# Patient Record
Sex: MALE | Race: WHITE | HISPANIC OR LATINO | Employment: FULL TIME | URBAN - METROPOLITAN AREA
[De-identification: names, ages, dates, MRNs, and addresses within clinical notes are randomized per-mention and may not be internally consistent; named-entity substitution may affect disease eponyms.]

---

## 2019-10-31 ENCOUNTER — HOSPITAL ENCOUNTER (EMERGENCY)
Facility: HOSPITAL | Age: 24
Discharge: HOME | End: 2019-10-31
Attending: EMERGENCY MEDICINE

## 2019-10-31 VITALS
BODY MASS INDEX: 25.71 KG/M2 | HEIGHT: 66 IN | RESPIRATION RATE: 20 BRPM | TEMPERATURE: 98 F | WEIGHT: 160 LBS | OXYGEN SATURATION: 98 % | DIASTOLIC BLOOD PRESSURE: 80 MMHG | SYSTOLIC BLOOD PRESSURE: 145 MMHG | HEART RATE: 101 BPM

## 2019-10-31 DIAGNOSIS — R11.2 NAUSEA AND VOMITING, INTRACTABILITY OF VOMITING NOT SPECIFIED, UNSPECIFIED VOMITING TYPE: Primary | ICD-10-CM

## 2019-10-31 DIAGNOSIS — R10.9 ABDOMINAL PAIN: ICD-10-CM

## 2019-10-31 LAB
ALBUMIN SERPL BCP-MCNC: 4.6 G/DL (ref 3.5–5.2)
ALP SERPL-CCNC: 70 U/L (ref 55–135)
ALT SERPL W/O P-5'-P-CCNC: 17 U/L (ref 10–44)
ANION GAP SERPL CALC-SCNC: 16 MMOL/L (ref 8–16)
AST SERPL-CCNC: 30 U/L (ref 10–40)
BASOPHILS # BLD AUTO: 0.02 K/UL (ref 0–0.2)
BASOPHILS NFR BLD: 0.2 % (ref 0–1.9)
BILIRUB SERPL-MCNC: 0.6 MG/DL (ref 0.1–1)
BILIRUB UR QL STRIP: NEGATIVE
BUN SERPL-MCNC: 9 MG/DL (ref 6–20)
CALCIUM SERPL-MCNC: 9.6 MG/DL (ref 8.7–10.5)
CHLORIDE SERPL-SCNC: 102 MMOL/L (ref 95–110)
CLARITY UR: CLEAR
CO2 SERPL-SCNC: 23 MMOL/L (ref 23–29)
COLOR UR: YELLOW
CREAT SERPL-MCNC: 0.8 MG/DL (ref 0.5–1.4)
DIFFERENTIAL METHOD: ABNORMAL
EOSINOPHIL # BLD AUTO: 0 K/UL (ref 0–0.5)
EOSINOPHIL NFR BLD: 0 % (ref 0–8)
ERYTHROCYTE [DISTWIDTH] IN BLOOD BY AUTOMATED COUNT: 13.4 % (ref 11.5–14.5)
EST. GFR  (AFRICAN AMERICAN): >60 ML/MIN/1.73 M^2
EST. GFR  (NON AFRICAN AMERICAN): >60 ML/MIN/1.73 M^2
ETHANOL SERPL-MCNC: 227 MG/DL
GLUCOSE SERPL-MCNC: 124 MG/DL (ref 70–110)
GLUCOSE UR QL STRIP: NEGATIVE
HCT VFR BLD AUTO: 45.5 % (ref 40–54)
HGB BLD-MCNC: 15.2 G/DL (ref 14–18)
HGB UR QL STRIP: NEGATIVE
KETONES UR QL STRIP: NEGATIVE
LEUKOCYTE ESTERASE UR QL STRIP: NEGATIVE
LIPASE SERPL-CCNC: 21 U/L (ref 4–60)
LYMPHOCYTES # BLD AUTO: 2.6 K/UL (ref 1–4.8)
LYMPHOCYTES NFR BLD: 30.6 % (ref 18–48)
MCH RBC QN AUTO: 29.6 PG (ref 27–31)
MCHC RBC AUTO-ENTMCNC: 33.4 G/DL (ref 32–36)
MCV RBC AUTO: 89 FL (ref 82–98)
MONOCYTES # BLD AUTO: 0.3 K/UL (ref 0.3–1)
MONOCYTES NFR BLD: 3 % (ref 4–15)
NEUTROPHILS # BLD AUTO: 5.6 K/UL (ref 1.8–7.7)
NEUTROPHILS NFR BLD: 66.2 % (ref 38–73)
NITRITE UR QL STRIP: NEGATIVE
PH UR STRIP: 7 [PH] (ref 5–8)
PLATELET # BLD AUTO: 260 K/UL (ref 150–350)
PMV BLD AUTO: 10 FL (ref 9.2–12.9)
POTASSIUM SERPL-SCNC: 3.8 MMOL/L (ref 3.5–5.1)
PROT SERPL-MCNC: 7.6 G/DL (ref 6–8.4)
PROT UR QL STRIP: NEGATIVE
RBC # BLD AUTO: 5.14 M/UL (ref 4.6–6.2)
SODIUM SERPL-SCNC: 141 MMOL/L (ref 136–145)
SP GR UR STRIP: 1.01 (ref 1–1.03)
TROPONIN I SERPL DL<=0.01 NG/ML-MCNC: <0.006 NG/ML (ref 0–0.03)
URN SPEC COLLECT METH UR: NORMAL
UROBILINOGEN UR STRIP-ACNC: NEGATIVE EU/DL
WBC # BLD AUTO: 8.42 K/UL (ref 3.9–12.7)

## 2019-10-31 PROCEDURE — 81003 URINALYSIS AUTO W/O SCOPE: CPT

## 2019-10-31 PROCEDURE — 80320 DRUG SCREEN QUANTALCOHOLS: CPT

## 2019-10-31 PROCEDURE — 93010 EKG 12-LEAD: ICD-10-PCS | Mod: ,,, | Performed by: STUDENT IN AN ORGANIZED HEALTH CARE EDUCATION/TRAINING PROGRAM

## 2019-10-31 PROCEDURE — 85025 COMPLETE CBC W/AUTO DIFF WBC: CPT

## 2019-10-31 PROCEDURE — 84484 ASSAY OF TROPONIN QUANT: CPT

## 2019-10-31 PROCEDURE — 93005 ELECTROCARDIOGRAM TRACING: CPT

## 2019-10-31 PROCEDURE — 83690 ASSAY OF LIPASE: CPT

## 2019-10-31 PROCEDURE — 99285 EMERGENCY DEPT VISIT HI MDM: CPT | Mod: 25

## 2019-10-31 PROCEDURE — 96374 THER/PROPH/DIAG INJ IV PUSH: CPT

## 2019-10-31 PROCEDURE — 96361 HYDRATE IV INFUSION ADD-ON: CPT

## 2019-10-31 PROCEDURE — 96375 TX/PRO/DX INJ NEW DRUG ADDON: CPT | Mod: 59

## 2019-10-31 PROCEDURE — 63600175 PHARM REV CODE 636 W HCPCS: Performed by: NURSE PRACTITIONER

## 2019-10-31 PROCEDURE — 93010 ELECTROCARDIOGRAM REPORT: CPT | Mod: ,,, | Performed by: STUDENT IN AN ORGANIZED HEALTH CARE EDUCATION/TRAINING PROGRAM

## 2019-10-31 PROCEDURE — 80053 COMPREHEN METABOLIC PANEL: CPT

## 2019-10-31 RX ORDER — ONDANSETRON 4 MG/1
4 TABLET, FILM COATED ORAL EVERY 6 HOURS
Qty: 12 TABLET | Refills: 0 | Status: SHIPPED | OUTPATIENT
Start: 2019-10-31

## 2019-10-31 RX ORDER — ONDANSETRON 2 MG/ML
4 INJECTION INTRAMUSCULAR; INTRAVENOUS
Status: COMPLETED | OUTPATIENT
Start: 2019-10-31 | End: 2019-10-31

## 2019-10-31 RX ADMIN — LORAZEPAM 1 MG: 2 INJECTION INTRAMUSCULAR; INTRAVENOUS at 02:10

## 2019-10-31 RX ADMIN — ONDANSETRON 4 MG: 2 INJECTION INTRAMUSCULAR; INTRAVENOUS at 01:10

## 2019-10-31 RX ADMIN — SODIUM CHLORIDE 1000 ML: 0.9 INJECTION, SOLUTION INTRAVENOUS at 02:10

## 2019-10-31 RX ADMIN — SODIUM CHLORIDE 1000 ML: 0.9 INJECTION, SOLUTION INTRAVENOUS at 12:10

## 2019-10-31 NOTE — ED TRIAGE NOTES
"Pt presents to ED with c/o "intoxication". Pt states he has been drinking alcohol x 4 days; confirms drinking 20 beers / day. Pt states he has been unable to sleep and has not been eating. Confirms gen pain; rates 10 on 1/10 pain scale. + Tremors to hands. - Hallucinations and delusions.  "

## 2019-10-31 NOTE — ED PROVIDER NOTES
"Encounter Date: 10/31/2019       History     Chief Complaint   Patient presents with    Emesis     pt presents to ED today c/o vomiting onset yesterday he reports pain to abdomen and consuming ETOH x 5 days      24-year-old male presents ED for evaluation of generalized abdominal pain since yesterday.  Associates nausea and vomiting.  Patient reports multiple episodes of non-bloody vomiting. Patient reports that his wife caught him cheating, he has been "depressed" and drinking beer non-stop for 5 days. Denies SI/HI.  Denies any alleviating or exacerbating factors.  Denies fever, chills, neck pain/stiffness, SOB, chest pain, diarrhea, dysuria, hematuria, or any other concerns at this time.    The history is provided by the patient.     Review of patient's allergies indicates:  No Known Allergies  No past medical history on file.  No past surgical history on file.  No family history on file.  Social History     Tobacco Use    Smoking status: Not on file   Substance Use Topics    Alcohol use: Not on file    Drug use: Not on file     Review of Systems   Constitutional: Positive for appetite change (decreased). Negative for fever.   Respiratory: Negative for shortness of breath.    Cardiovascular: Negative for chest pain.   Gastrointestinal: Positive for abdominal pain (generalized ), nausea and vomiting. Negative for diarrhea.   Musculoskeletal: Negative for neck pain and neck stiffness.   Hematological: Does not bruise/bleed easily.   All other systems reviewed and are negative.      Physical Exam     Initial Vitals   BP Pulse Resp Temp SpO2   10/31/19 1214 10/31/19 1214 10/31/19 1214 10/31/19 1215 10/31/19 1214   (!) 164/101 (!) 119 (!) 22 98.3 °F (36.8 °C) 97 %      MAP       --                Physical Exam    Vitals reviewed.  Constitutional: He appears well-developed and well-nourished.  Non-toxic appearance. He does not have a sickly appearance.   HENT:   Head: Atraumatic.   Mouth/Throat: Mucous membranes are " dry.   MM dry.    Eyes: EOM are normal.   Neck: Normal range of motion, full passive range of motion without pain and phonation normal. Neck supple.   Cardiovascular: Regular rhythm. Tachycardia present.    Asymptomatic hypertension.   Pulmonary/Chest: Effort normal. Tachypnea noted.   Abdominal: Soft. Normal appearance and bowel sounds are normal. He exhibits no distension. There is no tenderness. There is no rigidity, no rebound, no guarding and no CVA tenderness.   Neurological: He is alert and oriented to person, place, and time.   Skin: Skin is warm. No rash noted.   Psychiatric: He has a normal mood and affect.         ED Course   Procedures  Labs Reviewed   COMPREHENSIVE METABOLIC PANEL - Abnormal; Notable for the following components:       Result Value    Glucose 124 (*)     All other components within normal limits   CBC W/ AUTO DIFFERENTIAL - Abnormal; Notable for the following components:    Mono% 3.0 (*)     All other components within normal limits   ALCOHOL,MEDICAL (ETHANOL) - Abnormal; Notable for the following components:    Alcohol, Medical, Serum 227 (*)     All other components within normal limits   LIPASE   URINALYSIS, REFLEX TO URINE CULTURE    Narrative:     Preferred Collection Type->Urine, Clean Catch   TROPONIN I   TROPONIN I          Imaging Results          X-Ray Abdomen Flat And Erect (Final result)  Result time 10/31/19 13:27:42    Final result by Diana Cardenas MD (10/31/19 13:27:42)                 Impression:      Although the bowel gas pattern is not normal, the source of the patient's abdominal pain is not established on this study.  Clinical considerations will determine if further investigation such as abdominal and pelvic CT is warranted      Electronically signed by: Diana Cardenas MD  Date:    10/31/2019  Time:    13:27             Narrative:    EXAMINATION:  XR ABDOMEN FLAT AND ERECT    CLINICAL HISTORY:  Unspecified abdominal pain    TECHNIQUE:  Flat and erect AP views  "of the abdomen were performed.    COMPARISON:  None    FINDINGS:  Gas is present in numerous loops of nondistended bowel.  Fecal material is evident in the colon.    I detect no convincing evidence of intramural gas, intra portal gas or free peritoneal gas, no convincing evidence of mass effect or unusual calcification and no convincing evidence of disease in the imaged portion of the chest.    I detect no significant osseous abnormality.                                 Medical Decision Making:   History:   Old Medical Records: I decided to obtain old medical records.  Initial Assessment:   24-year-old male presents ED for evaluation of generalized abdominal pain since yesterday.  Associates nausea and vomiting.  Patient reports multiple episodes of non-bloody vomiting. Patient reports that his wife caught him cheating, he has been "depressed" and drinking beer non-stop for 5 days. Denies SI/HI.  Denies any alleviating or exacerbating factors.  Denies fever, chills, neck pain/stiffness, SOB, chest pain, diarrhea, dysuria, hematuria, or any other concerns at this time.        Clinical Tests:   Lab Tests: Ordered and Reviewed  Radiological Study: Reviewed and Ordered  ED Management:  UA, labs, IV fluids, Xray, ativan, PO fluid challenge, EKG  Patient initially tachycardic and hypertensive in ED.  I believe patient's blood pressure and heart rate were elevated due to patient's situation.  I do not suspect ACS, hypertensive urgency, emergency.  Advised patient to follow up with PCP or [facility] approximately 1 week for blood pressure follow-up and we daily recording of blood pressure readings.  Patient's blood pressure and heart rate decreased in ED without any acute interventions.  EKG shows no acute abnormalities.  UA shows no signs of infection or hematuria. Ethanol 227, patient received two liters IVF in ED.  Troponin within normal limits.  WBC within normal limits.  Lipase within normal limits.  " Normal kidney function. Xray shows no acute abnormalities.  Symptoms most consistent with benign cause of abdominal pain.  There is no guarding/rebound or other peritoneal signs to suggest perforation or other emergent surgical process.  No fever or leukocytosis to suggest acute bacterial infection.  No signs of focal abdominal tenderness to suggest cholecystitis, appendicitis, diverticulitis, or  source.  Tolerating PO.  Patient is hemodynamically stable be discharged home with prescription for Zofran.  Instructed to take prescribed Zofran as labeled as needed for nausea or vomiting and to follow up with [facility] in 2-3 days.  Instructed to return to ED for any concerns or worsening symptoms such as fever, increased pain, nonstop vomiting.  Patient verbalized understanding, compliance, and agreement treatment plan.                      Clinical Impression:       ICD-10-CM ICD-9-CM   1. Nausea and vomiting, intractability of vomiting not specified, unspecified vomiting type R11.2 787.01   2. Abdominal pain R10.9 789.00                                Derek Rosario NP  10/31/19 1041

## 2019-10-31 NOTE — DISCHARGE INSTRUCTIONS
Take prescribed medication as labeled as needed for nausea or vomiting.  Increase oral hydration get plenty of rest.  Stop drinking so much alcohol.  Follow-up with [facility] or PCP in 2-3 days return to ED for any concerns or worsening symptoms.

## 2019-10-31 NOTE — ED NOTES
Pt offered cup of water for PO challenge. Pt drank 6 ounces water; no n/v noted. PO challenge completed. Ginger well.

## 2019-10-31 NOTE — ED NOTES
Pt resting quietly on stretcher playing with cell phone. Denies c/o chest pain at this time. Resp even and unlabored. PRESTON Mcclure updated with results of PO challenge.

## 2019-10-31 NOTE — ED NOTES
Pt updated with plan of care to move to results waiting room pending d/c instructions. Verbalized understanding.

## 2019-11-01 ENCOUNTER — PES CALL (OUTPATIENT)
Dept: ADMINISTRATIVE | Facility: CLINIC | Age: 24
End: 2019-11-01

## 2019-11-01 NOTE — ED NOTES
"Pt extremely agitated, stating "chest hurts"; tachypnea noted. Instructed pt to slow breathing down and MARA Reed updated. RN remained with pt until he calmed down.   "

## 2019-11-04 DIAGNOSIS — R11.10 VOMITING, INTRACTABILITY OF VOMITING NOT SPECIFIED, PRESENCE OF NAUSEA NOT SPECIFIED, UNSPECIFIED VOMITING TYPE: Primary | ICD-10-CM
